# Patient Record
Sex: FEMALE | Race: WHITE | Employment: FULL TIME
[De-identification: names, ages, dates, MRNs, and addresses within clinical notes are randomized per-mention and may not be internally consistent; named-entity substitution may affect disease eponyms.]

---

## 2024-10-14 ENCOUNTER — HOSPITAL ENCOUNTER (EMERGENCY)
Facility: HOSPITAL | Age: 34
Discharge: HOME | End: 2024-10-14
Attending: EMERGENCY MEDICINE
Payer: COMMERCIAL

## 2024-10-14 VITALS
DIASTOLIC BLOOD PRESSURE: 83 MMHG | SYSTOLIC BLOOD PRESSURE: 149 MMHG | TEMPERATURE: 97 F | WEIGHT: 153 LBS | BODY MASS INDEX: 26.12 KG/M2 | OXYGEN SATURATION: 100 % | HEIGHT: 64 IN | RESPIRATION RATE: 18 BRPM | HEART RATE: 91 BPM

## 2024-10-14 DIAGNOSIS — S61.412A LACERATION OF LEFT HAND WITHOUT FOREIGN BODY, INITIAL ENCOUNTER: Primary | ICD-10-CM

## 2024-10-14 PROCEDURE — 2500000003 HC RX 250 WO HCPCS

## 2024-10-14 PROCEDURE — 99283 EMERGENCY DEPT VISIT LOW MDM: CPT

## 2024-10-14 PROCEDURE — 12001 RPR S/N/AX/GEN/TRNK 2.5CM/<: CPT

## 2024-10-14 PROCEDURE — 6370000000 HC RX 637 (ALT 250 FOR IP)

## 2024-10-14 RX ORDER — LEVOTHYROXINE SODIUM 25 UG/1
25 TABLET ORAL DAILY
COMMUNITY

## 2024-10-14 RX ORDER — ACETAMINOPHEN 500 MG
1000 TABLET ORAL ONCE
Status: COMPLETED | OUTPATIENT
Start: 2024-10-14 | End: 2024-10-14

## 2024-10-14 RX ORDER — GINSENG 100 MG
CAPSULE ORAL
Status: COMPLETED | OUTPATIENT
Start: 2024-10-14 | End: 2024-10-14

## 2024-10-14 RX ORDER — LIDOCAINE HYDROCHLORIDE AND EPINEPHRINE 10; 10 MG/ML; UG/ML
20 INJECTION, SOLUTION INFILTRATION; PERINEURAL
Status: COMPLETED | OUTPATIENT
Start: 2024-10-14 | End: 2024-10-14

## 2024-10-14 RX ADMIN — BACITRACIN 1 EACH: 500 OINTMENT TOPICAL at 18:48

## 2024-10-14 RX ADMIN — LIDOCAINE HYDROCHLORIDE,EPINEPHRINE BITARTRATE 20 ML: 10; .01 INJECTION, SOLUTION INFILTRATION; PERINEURAL at 18:47

## 2024-10-14 RX ADMIN — ACETAMINOPHEN 1000 MG: 500 TABLET ORAL at 18:47

## 2024-10-14 ASSESSMENT — PAIN SCALES - GENERAL: PAINLEVEL_OUTOF10: 8

## 2024-10-14 NOTE — ED PROVIDER NOTES
08:05:31 PM      PATIENT REFERRED TO:  [facility] [place] [facility]  [address] [address] [phone]    As needed, If symptoms worsen, For suture removal: 10-14 days (here, urgent care, or primary care)      DISCHARGE MEDICATIONS:  Discharge Medication List as of 10/14/2024  7:58 PM            (Please note that portions of this note were completed with a voice recognition program.  Efforts were made to edit the dictations but occasionally words are mis-transcribed.)    Benito Angeles PA-C (electronically signed)  Emergency Attending Physician / Physician Assistant / Nurse Practitioner       Presentation, management, and disposition were discussed with the attending physician, Dr. Padilla who is in agreement with plan of care.      Benito Angeles PA-C  10/15/24 0004

## 2024-10-14 NOTE — DISCHARGE INSTRUCTIONS
Today you were seen for a laceration. Given the wound, we needed to repair the area with sutures. The wound was closed with 5 stitches. Please return here, your family doctor, or an urgent care facility in 10 to 14 days for the sutures to be removed. Please monitor for any signs of infection such as redness surrounding and spreading around the site of the wound, streaking line of redness from the wound, foul smelling discharge exiting the wound, or fever/chills. Please keep the area covered and dry for the next 24-48 hours. After this, you may wash the area under warm water and a gentle soap 1-2 times daily. Please make sure to keep the area covered and bandaged, especially if you are doing an activity where there could possibly be dirt or debris that could enter the wound.

## 2024-10-14 NOTE — ED TRIAGE NOTES
ED triage note: ambulatory with a steady gait. Patient reports approx 30 mins ago was cutting avocado, the knife slipped and sliced left palm. States last tdap was within the past 5 years.